# Patient Record
Sex: FEMALE | Race: WHITE | Employment: FULL TIME | URBAN - METROPOLITAN AREA
[De-identification: names, ages, dates, MRNs, and addresses within clinical notes are randomized per-mention and may not be internally consistent; named-entity substitution may affect disease eponyms.]

---

## 2017-02-20 RX ORDER — FENOFIBRIC ACID 135 MG/1
135 CAPSULE, DELAYED RELEASE ORAL DAILY
Qty: 30 CAPSULE | Refills: 4 | Status: SHIPPED | OUTPATIENT
Start: 2017-02-20 | End: 2017-08-04 | Stop reason: SDUPTHER

## 2017-07-28 RX ORDER — FENOFIBRIC ACID 135 MG/1
CAPSULE, DELAYED RELEASE ORAL
Qty: 30 CAPSULE | Refills: 4 | OUTPATIENT
Start: 2017-07-28

## 2017-08-04 ENCOUNTER — OFFICE VISIT (OUTPATIENT)
Dept: FAMILY MEDICINE CLINIC | Age: 57
End: 2017-08-04

## 2017-08-04 VITALS
SYSTOLIC BLOOD PRESSURE: 130 MMHG | HEART RATE: 86 BPM | DIASTOLIC BLOOD PRESSURE: 82 MMHG | BODY MASS INDEX: 28.35 KG/M2 | WEIGHT: 155 LBS | OXYGEN SATURATION: 98 %

## 2017-08-04 DIAGNOSIS — Z00.00 HEALTHCARE MAINTENANCE: Primary | ICD-10-CM

## 2017-08-04 DIAGNOSIS — L40.50 PSORIATIC ARTHRITIS (HCC): ICD-10-CM

## 2017-08-04 DIAGNOSIS — Z11.59 NEED FOR HEPATITIS C SCREENING TEST: ICD-10-CM

## 2017-08-04 DIAGNOSIS — E55.9 VITAMIN D DEFICIENCY: ICD-10-CM

## 2017-08-04 DIAGNOSIS — Z12.31 ENCOUNTER FOR SCREENING MAMMOGRAM FOR BREAST CANCER: ICD-10-CM

## 2017-08-04 DIAGNOSIS — Z11.4 SCREENING FOR HIV WITHOUT PRESENCE OF RISK FACTORS: ICD-10-CM

## 2017-08-04 DIAGNOSIS — Z12.11 SCREENING FOR COLON CANCER: ICD-10-CM

## 2017-08-04 DIAGNOSIS — E78.2 MIXED HYPERLIPIDEMIA: ICD-10-CM

## 2017-08-04 PROCEDURE — 99396 PREV VISIT EST AGE 40-64: CPT | Performed by: NURSE PRACTITIONER

## 2017-08-04 RX ORDER — FENOFIBRIC ACID 135 MG/1
135 CAPSULE, DELAYED RELEASE ORAL DAILY
Qty: 30 CAPSULE | Refills: 4 | Status: SHIPPED | OUTPATIENT
Start: 2017-08-04 | End: 2018-02-23 | Stop reason: SDUPTHER

## 2017-08-04 ASSESSMENT — ENCOUNTER SYMPTOMS
BACK PAIN: 0
SHORTNESS OF BREATH: 0
SINUS PRESSURE: 0
NAUSEA: 0
CHEST TIGHTNESS: 0
EYE PAIN: 0
BLOOD IN STOOL: 0
ABDOMINAL PAIN: 0
WHEEZING: 0
CONSTIPATION: 0
APNEA: 0
DIARRHEA: 0
EYE REDNESS: 0
COUGH: 0
VOMITING: 0
RHINORRHEA: 0
ABDOMINAL DISTENTION: 0

## 2017-10-16 ENCOUNTER — TELEPHONE (OUTPATIENT)
Dept: OTHER | Facility: CLINIC | Age: 57
End: 2017-10-16

## 2017-10-16 NOTE — TELEPHONE ENCOUNTER
Spoke with patient regarding status of FIT test.  Patient confirmed that test has not been completed yet. She forgot about the test.  Patient agreeed to complete test ASAP.

## 2017-11-13 ENCOUNTER — OFFICE VISIT (OUTPATIENT)
Dept: FAMILY MEDICINE CLINIC | Age: 57
End: 2017-11-13

## 2017-11-13 VITALS
HEART RATE: 86 BPM | OXYGEN SATURATION: 98 % | BODY MASS INDEX: 28.89 KG/M2 | DIASTOLIC BLOOD PRESSURE: 78 MMHG | WEIGHT: 157 LBS | HEIGHT: 62 IN | SYSTOLIC BLOOD PRESSURE: 150 MMHG

## 2017-11-13 DIAGNOSIS — E66.3 OVERWEIGHT: ICD-10-CM

## 2017-11-13 DIAGNOSIS — E55.9 VITAMIN D DEFICIENCY: ICD-10-CM

## 2017-11-13 DIAGNOSIS — R03.0 ELEVATED BLOOD PRESSURE READING: ICD-10-CM

## 2017-11-13 DIAGNOSIS — L40.50 PSORIATIC ARTHRITIS (HCC): ICD-10-CM

## 2017-11-13 DIAGNOSIS — E78.5 HYPERLIPIDEMIA, UNSPECIFIED HYPERLIPIDEMIA TYPE: Primary | ICD-10-CM

## 2017-11-13 DIAGNOSIS — Z00.00 HEALTHCARE MAINTENANCE: ICD-10-CM

## 2017-11-13 PROCEDURE — 99214 OFFICE O/P EST MOD 30 MIN: CPT | Performed by: FAMILY MEDICINE

## 2017-11-13 NOTE — PROGRESS NOTES
Clara Ledesma   YOB: 1960    Date of Visit:  11/13/2017    No Known Allergies  Outpatient Prescriptions Marked as Taking for the 11/13/17 encounter (Office Visit) with Lisa Wheatley MD   Medication Sig Dispense Refill    fenofibric acid (FIBRICOR) 135 MG CPDR capsule Take 1 capsule by mouth daily 30 capsule 4    atorvastatin (LIPITOR) 40 MG tablet Take 1 tablet by mouth daily 30 tablet 5    methotrexate (RHEUMATREX) 2.5 MG chemo tablet Take 8 tablets by mouth once a week      folic acid (FOLVITE) 1 MG tablet       Cholecalciferol (VITAMIN D3) 2000 UNITS CAPS Take 1 capsule by mouth daily      aspirin 81 MG EC tablet Take 81 mg by mouth daily. Vitals:    11/13/17 1440   BP: (!) 150/78   Site: Right Arm   Pulse: 86   SpO2: 98%   Weight: 157 lb (71.2 kg)   Height: 5' 2\" (1.575 m)     Body mass index is 28.72 kg/m². Wt Readings from Last 3 Encounters:   11/13/17 157 lb (71.2 kg)   08/04/17 155 lb (70.3 kg)   12/16/15 155 lb (70.3 kg)     BP Readings from Last 3 Encounters:   11/13/17 (!) 150/78   08/04/17 130/82   12/16/15 138/74        HPI    Patient presents for pap. Psoriatic arthritis: Sees Rheumatologist:  Dr. Leandra Alonzo. Stable on current. HLD:  Taking lipitor and fibricor. Vit D def:  Taking MV daily. Elevated BP: Per patient just came from work which was very busy. Walks regularly. Watches her diet. HM: has FIT cards. Has scheduled mammogram for 12/15. Can't recall when she had Td last.  Planning to do her blood work but starts working at [employer] so can't come fasting. Overweight:  Social History     Social History    Marital status:      Spouse name: N/A    Number of children: N/A    Years of education: N/A     Occupational History    Not on file.      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 Doesn't like to talk about it. Review of Systems  As documented in the HPI. No cp or nikita. Physical Exam  GEN: well developed and well nourished, no acute distress  CV: regular rate and rhythm, no murmurs rubs or gallops  Resp: normal effort, clear auscultation bilaterally  Breast: nl breast exam. No palpable lumps. No axillary LAD. Assessment/Plan     1. Hyperlipidemia, unspecified hyperlipidemia type  Stable. Continue current regimen. Recheck labs as previously ordered. 2. Healthcare maintenance  - PAP SMEAR  Reminded to do FIT cards, labs, mammogram.  She declined Tdap today. 3. Psoriatic arthritis (HCC)  Stable. Continue current regimen. 4. Vitamin D deficiency  Stable. Continue current regimen. 5. Elevated blood pressure reading  Per pt 2/2 stress. Will monitor. Patient to monitor at home and advised to call us if it remains elevated. 6. Overweight  Counseled on lifestyle modifications including diet and exercise. Discussed medications with patient, who voiced understanding of their use and indications. All questions answered.     Return in about 1 year (around 11/13/2018) for Physical.

## 2017-11-16 LAB
HPV COMMENT: NORMAL
HPV TYPE 16: NOT DETECTED
HPV TYPE 18: NOT DETECTED
HPVOH (OTHER TYPES): NOT DETECTED

## 2018-02-05 DIAGNOSIS — E78.2 MIXED HYPERLIPIDEMIA: ICD-10-CM

## 2018-02-06 RX ORDER — ATORVASTATIN CALCIUM 40 MG/1
40 TABLET, FILM COATED ORAL DAILY
Qty: 30 TABLET | Refills: 5 | Status: SHIPPED | OUTPATIENT
Start: 2018-02-06 | End: 2018-05-09 | Stop reason: SDUPTHER

## 2018-02-28 DIAGNOSIS — Z11.59 NEED FOR HEPATITIS C SCREENING TEST: ICD-10-CM

## 2018-02-28 DIAGNOSIS — Z00.00 HEALTHCARE MAINTENANCE: ICD-10-CM

## 2018-02-28 DIAGNOSIS — Z11.4 SCREENING FOR HIV WITHOUT PRESENCE OF RISK FACTORS: ICD-10-CM

## 2018-02-28 DIAGNOSIS — E78.2 MIXED HYPERLIPIDEMIA: ICD-10-CM

## 2018-02-28 DIAGNOSIS — E55.9 VITAMIN D DEFICIENCY: ICD-10-CM

## 2018-02-28 LAB
ANION GAP SERPL CALCULATED.3IONS-SCNC: 13 MMOL/L (ref 3–16)
BUN BLDV-MCNC: 19 MG/DL (ref 7–20)
CALCIUM SERPL-MCNC: 9.3 MG/DL (ref 8.3–10.6)
CHLORIDE BLD-SCNC: 104 MMOL/L (ref 99–110)
CHOLESTEROL, TOTAL: 234 MG/DL (ref 0–199)
CO2: 26 MMOL/L (ref 21–32)
CREAT SERPL-MCNC: 0.6 MG/DL (ref 0.6–1.1)
GFR AFRICAN AMERICAN: >60
GFR NON-AFRICAN AMERICAN: >60
GLUCOSE BLD-MCNC: 113 MG/DL (ref 70–99)
HDLC SERPL-MCNC: 43 MG/DL (ref 40–60)
HEPATITIS C ANTIBODY INTERPRETATION: NORMAL
LDL CHOLESTEROL CALCULATED: 132 MG/DL
POTASSIUM SERPL-SCNC: 4.6 MMOL/L (ref 3.5–5.1)
SODIUM BLD-SCNC: 143 MMOL/L (ref 136–145)
TRIGL SERPL-MCNC: 297 MG/DL (ref 0–150)
VLDLC SERPL CALC-MCNC: 59 MG/DL

## 2018-03-01 LAB
ESTIMATED AVERAGE GLUCOSE: 105.4 MG/DL
HBA1C MFR BLD: 5.3 %
HIV AG/AB: NORMAL
HIV ANTIGEN: NORMAL
HIV-1 ANTIBODY: NORMAL
HIV-2 AB: NORMAL
VITAMIN D 25-HYDROXY: 27.6 NG/ML

## 2018-03-20 DIAGNOSIS — E78.2 MIXED HYPERLIPIDEMIA: ICD-10-CM

## 2018-03-20 RX ORDER — FENOFIBRIC ACID 135 MG/1
135 CAPSULE, DELAYED RELEASE ORAL DAILY
Qty: 90 CAPSULE | Refills: 1 | Status: SHIPPED | OUTPATIENT
Start: 2018-03-20 | End: 2018-10-02 | Stop reason: SDUPTHER

## 2018-03-23 DIAGNOSIS — E78.2 MIXED HYPERLIPIDEMIA: ICD-10-CM

## 2018-03-23 RX ORDER — FENOFIBRIC ACID 135 MG/1
135 CAPSULE, DELAYED RELEASE ORAL DAILY
Qty: 30 CAPSULE | Refills: 0 | OUTPATIENT
Start: 2018-03-23

## 2018-03-30 DIAGNOSIS — E78.2 MIXED HYPERLIPIDEMIA: ICD-10-CM

## 2018-04-02 RX ORDER — FENOFIBRIC ACID 135 MG/1
135 CAPSULE, DELAYED RELEASE ORAL DAILY
Qty: 30 CAPSULE | Refills: 0 | OUTPATIENT
Start: 2018-04-02

## 2018-05-09 DIAGNOSIS — E78.2 MIXED HYPERLIPIDEMIA: ICD-10-CM

## 2018-05-10 RX ORDER — ATORVASTATIN CALCIUM 40 MG/1
40 TABLET, FILM COATED ORAL DAILY
Qty: 30 TABLET | Refills: 5 | Status: SHIPPED | OUTPATIENT
Start: 2018-05-10 | End: 2020-10-02 | Stop reason: SDUPTHER

## 2018-10-02 DIAGNOSIS — E78.2 MIXED HYPERLIPIDEMIA: ICD-10-CM

## 2018-10-02 RX ORDER — FENOFIBRIC ACID 135 MG/1
135 CAPSULE, DELAYED RELEASE ORAL DAILY
Qty: 90 CAPSULE | Refills: 1 | Status: SHIPPED | OUTPATIENT
Start: 2018-10-02 | End: 2019-11-15 | Stop reason: SDUPTHER

## 2019-01-25 DIAGNOSIS — E78.2 MIXED HYPERLIPIDEMIA: ICD-10-CM

## 2019-01-28 RX ORDER — ATORVASTATIN CALCIUM 40 MG/1
TABLET, FILM COATED ORAL
Qty: 30 TABLET | Refills: 0 | OUTPATIENT
Start: 2019-01-28

## 2019-04-12 DIAGNOSIS — E78.2 MIXED HYPERLIPIDEMIA: ICD-10-CM

## 2019-04-15 RX ORDER — FENOFIBRIC ACID 135 MG/1
135 CAPSULE, DELAYED RELEASE ORAL DAILY
Qty: 30 CAPSULE | Refills: 0 | OUTPATIENT
Start: 2019-04-15

## 2019-04-30 ENCOUNTER — HOSPITAL ENCOUNTER (OUTPATIENT)
Dept: MAMMOGRAPHY | Age: 59
Discharge: HOME | End: 2019-05-05
Payer: COMMERCIAL

## 2019-04-30 DIAGNOSIS — Z12.31 VISIT FOR SCREENING MAMMOGRAM: ICD-10-CM

## 2019-04-30 PROCEDURE — 77067 SCR MAMMO BI INCL CAD: CPT

## 2019-11-15 DIAGNOSIS — E78.2 MIXED HYPERLIPIDEMIA: ICD-10-CM

## 2019-11-18 RX ORDER — FENOFIBRIC ACID 135 MG/1
135 CAPSULE, DELAYED RELEASE ORAL DAILY
Qty: 90 CAPSULE | Refills: 1 | Status: SHIPPED | OUTPATIENT
Start: 2019-11-18

## 2019-11-29 DIAGNOSIS — E78.2 MIXED HYPERLIPIDEMIA: ICD-10-CM

## 2019-11-29 RX ORDER — ATORVASTATIN CALCIUM 40 MG/1
40 TABLET, FILM COATED ORAL DAILY
Qty: 90 TABLET | Refills: 1 | OUTPATIENT
Start: 2019-11-29

## 2020-09-10 ENCOUNTER — TELEPHONE (OUTPATIENT)
Dept: FAMILY MEDICINE CLINIC | Age: 60
End: 2020-09-10

## 2020-09-10 NOTE — TELEPHONE ENCOUNTER
----- Message from [employer] sent at 9/10/2020  4:25 PM EDT -----  Subject: Refill Request    QUESTIONS  Name of Medication? fenofibric acid (FIBRICOR) 135 MG CPDR capsule  Patient-reported dosage and instructions? unknown  How many days do you have left? 0  Preferred Pharmacy? [facility]  Pharmacy phone number (if available)? [phone]  Additional Information for Provider? out of medication//appointment   request sent via message  ---------------------------------------------------------------------------  --------------  [address]  What is the best way for the office to contact you? OK to leave message on   voicemail   OK to respond with secure message via [employer] portal (only for patients   who have registered [employer] account)  Preferred Call Back Phone Number?  [phone]

## 2020-09-10 NOTE — TELEPHONE ENCOUNTER
Medication: request denied, patient needs appointment. Last OV 11.13.2017 - [facility] for patient to call to schedule OV prior to refill. Requested Prescriptions      No prescriptions requested or ordered in this encounter        Last Filled:  11/18/2019 #90 w/ 1 refill     Patient Phone Number: [phone] (home) [phone] (work)    Last appt: 11.13.2017  Next appt: Visit date not found    Last OARRS: No flowsheet data found.

## 2020-09-11 ENCOUNTER — TELEPHONE (OUTPATIENT)
Dept: FAMILY MEDICINE CLINIC | Age: 60
End: 2020-09-11

## 2020-09-11 NOTE — TELEPHONE ENCOUNTER
----- Message from Adan Olivarez sent at 9/11/2020  8:40 AM EDT -----  Subject: Message to Provider    QUESTIONS  Information for Provider? Pt needs cholesterol check done before being   seen and before she can get medication refill. Please place order for   bloodwork and let her know where to go.   ---------------------------------------------------------------------------  --------------  CALL BACK INFO  What is the best way for the office to contact you? OK to leave message on   voicemail  Preferred Call Back Phone Number? [phone]  ---------------------------------------------------------------------------  --------------  SCRIPT ANSWERS  Relationship to Patient?  Self
Patient requesting lab orders. - pended  Also needs to schedule appointment.    Last OV 11.13.2017
Preferred Call Back Phone Number?  [phone]  [facility] for patient to call back to schedule physical. Labs will be ordered at the time of her visit per Dr. Davin Cavazos
Scheduled 10.01.2020
Since she has not been seen in 3 years she will need to do her appointment prior to doing lab orders. She can plan to fast and do the labs at her visit if she would like after her visit though.
5

## 2020-09-11 NOTE — TELEPHONE ENCOUNTER
----- Message from Weisami Galindo sent at 9/11/2020 11:02 AM EDT -----  Subject: Appointment Request    Reason for Call: Routine Physical Exam with PAP    QUESTIONS  Type of Appointment? Established Patient  Reason for appointment request? Available appointments did not meet   patient need  Additional Information for Provider? Pt is out of meds and said she needs   to be seen before she can get a refills on atorvastin and fenofibric No   appts available before 10/1. Please advise pt if she can be seen before   10/1.   ---------------------------------------------------------------------------  --------------  CALL BACK INFO  What is the best way for the office to contact you? OK to leave message on   voicemail  Preferred Call Back Phone Number? [phone]  ---------------------------------------------------------------------------  --------------  SCRIPT ANSWERS  Relationship to Patient? Self  Appointment reason? Well Care/Follow Ups  Select a Well Care/Follow Ups appointment reason? Adult Physical Exam   [Medicare Annual Wellness   AWV   PAP   Pelvic]  (Is the patient requesting to be seen urgently for their symptoms?)? No  (If the patient is female   ask this question) Are you requesting a pap smear with your physical   exam? Yes  (Patient is Medicare and requesting Annual Wellness Visit)? No  Have you been diagnosed with   tested for   or told that you are suspected of having COVID-19 (Coronavirus)? No  Have you had a fever or taken medication to treat a fever within the past   3 days? No  Have you had a cough   shortness of breath or flu-like symptoms within the past 3 days? No  Do you currently have flu-like symptoms including fever or chills   cough   shortness of breath   or difficulty breathing   or new loss of taste or smell? No  (Service Expert  click yes below to proceed with "[employer] As Usual   Scheduling)?  Yes

## 2020-10-01 ENCOUNTER — OFFICE VISIT (OUTPATIENT)
Dept: FAMILY MEDICINE CLINIC | Age: 60
End: 2020-10-01
Payer: COMMERCIAL

## 2020-10-01 VITALS
BODY MASS INDEX: 28.89 KG/M2 | HEART RATE: 84 BPM | OXYGEN SATURATION: 98 % | TEMPERATURE: 98.1 F | SYSTOLIC BLOOD PRESSURE: 138 MMHG | DIASTOLIC BLOOD PRESSURE: 70 MMHG | WEIGHT: 157 LBS | HEIGHT: 62 IN

## 2020-10-01 DIAGNOSIS — Z00.00 HEALTHCARE MAINTENANCE: ICD-10-CM

## 2020-10-01 DIAGNOSIS — E55.9 VITAMIN D DEFICIENCY: ICD-10-CM

## 2020-10-01 LAB
ANION GAP SERPL CALCULATED.3IONS-SCNC: 13 MMOL/L (ref 3–16)
BASOPHILS ABSOLUTE: 0.1 K/UL (ref 0–0.2)
BASOPHILS RELATIVE PERCENT: 0.9 %
BUN BLDV-MCNC: 10 MG/DL (ref 7–20)
CALCIUM SERPL-MCNC: 9.9 MG/DL (ref 8.3–10.6)
CHLORIDE BLD-SCNC: 105 MMOL/L (ref 99–110)
CHOLESTEROL, TOTAL: 338 MG/DL (ref 0–199)
CO2: 25 MMOL/L (ref 21–32)
CREAT SERPL-MCNC: 0.5 MG/DL (ref 0.6–1.2)
EOSINOPHILS ABSOLUTE: 0.1 K/UL (ref 0–0.6)
EOSINOPHILS RELATIVE PERCENT: 2.3 %
GFR AFRICAN AMERICAN: >60
GFR NON-AFRICAN AMERICAN: >60
GLUCOSE BLD-MCNC: 100 MG/DL (ref 70–99)
HCT VFR BLD CALC: 42.7 % (ref 36–48)
HDLC SERPL-MCNC: 36 MG/DL (ref 40–60)
HEMOGLOBIN: 14.5 G/DL (ref 12–16)
LDL CHOLESTEROL CALCULATED: 246 MG/DL
LYMPHOCYTES ABSOLUTE: 1.2 K/UL (ref 1–5.1)
LYMPHOCYTES RELATIVE PERCENT: 19 %
MCH RBC QN AUTO: 32.5 PG (ref 26–34)
MCHC RBC AUTO-ENTMCNC: 33.9 G/DL (ref 31–36)
MCV RBC AUTO: 96 FL (ref 80–100)
MONOCYTES ABSOLUTE: 0.5 K/UL (ref 0–1.3)
MONOCYTES RELATIVE PERCENT: 7.4 %
NEUTROPHILS ABSOLUTE: 4.3 K/UL (ref 1.7–7.7)
NEUTROPHILS RELATIVE PERCENT: 70.4 %
PDW BLD-RTO: 13.1 % (ref 12.4–15.4)
PLATELET # BLD: 224 K/UL (ref 135–450)
PMV BLD AUTO: 8.8 FL (ref 5–10.5)
POTASSIUM SERPL-SCNC: 4.7 MMOL/L (ref 3.5–5.1)
RBC # BLD: 4.44 M/UL (ref 4–5.2)
SODIUM BLD-SCNC: 143 MMOL/L (ref 136–145)
TRIGL SERPL-MCNC: 282 MG/DL (ref 0–150)
VITAMIN D 25-HYDROXY: 52.8 NG/ML
VLDLC SERPL CALC-MCNC: 56 MG/DL
WBC # BLD: 6.2 K/UL (ref 4–11)

## 2020-10-01 PROCEDURE — 99396 PREV VISIT EST AGE 40-64: CPT | Performed by: FAMILY MEDICINE

## 2020-10-01 ASSESSMENT — PATIENT HEALTH QUESTIONNAIRE - PHQ9
1. LITTLE INTEREST OR PLEASURE IN DOING THINGS: 0
SUM OF ALL RESPONSES TO PHQ QUESTIONS 1-9: 0
SUM OF ALL RESPONSES TO PHQ9 QUESTIONS 1 & 2: 0
2. FEELING DOWN, DEPRESSED OR HOPELESS: 0
SUM OF ALL RESPONSES TO PHQ QUESTIONS 1-9: 0

## 2020-10-01 NOTE — PROGRESS NOTES
History and Physical      Chief Complaint:   Lindsey Le is a 60 y.o. female who presents for complete physical examination. Chief Complaint   Patient presents with    Annual Exam     fasting today    Gynecologic Exam       HPI: Psoriatic arthritis: Sees Rheumatologist:  Dr. Ani Cuellar. Has not been in awhile as not sure about the medication. Using CBD oil and arthritis cream and it is really good. Stable on current.     HLD: Off the lipitor and just finished the fenofibrate - 1 week ago. She has changed her diet and trying to make good choices.      Vit D def:  Taking MV daily and vit D.      LMP about age 49.      SH: has 2 grand babies. Lives with son, his girl friend and their two kids and her daughter. Working.        Vitals:    10/01/20 1128   BP: 138/70   Site: Left Upper Arm   Position: Sitting   Cuff Size: Medium Adult   Pulse: 84   Temp: 98.1 °F (36.7 °C)   TempSrc: Temporal   SpO2: 98%   Weight: 157 lb (71.2 kg)   Height: 5' 2\" (1.575 m)       Wt Readings from Last 3 Encounters:   10/01/20 157 lb (71.2 kg)   11/13/17 157 lb (71.2 kg)   08/04/17 155 lb (70.3 kg)     BP Readings from Last 3 Encounters:   10/01/20 138/70   11/13/17 (!) 150/78   08/04/17 130/82       Patient Active Problem List   Diagnosis    Hyperlipidemia    Hypertriglyceridemia    Dermatitis    Neck stiffness    Toe pain    Eczema    Neck pain    Muscle spasm    Knee swelling    Psoriatic arthritis (HCC)    Vitamin D deficiency       Preventive Care:  Health Maintenance   Topic Date Due    DTaP/Tdap/Td vaccine (1 - Tdap) 10/11/1978    Colon cancer screen colonoscopy  10/11/2009    Lipid screen  02/28/2019    Flu vaccine (1) 10/01/2021 (Originally 9/1/2020)    Shingles Vaccine (1 of 2) 10/01/2021 (Originally 10/11/2009)    Diabetes screen  02/28/2021    Breast cancer screen  04/30/2021    Cervical cancer screen  11/13/2022    Hepatitis C screen  Completed    HIV screen  Completed    Hepatitis A vaccine  Aged Out    Hepatitis B vaccine  Aged Out    Hib vaccine  Aged Out    Meningococcal (ACWY) vaccine  Aged Out    Pneumococcal 0-64 years Vaccine  Aged Out          There is no immunization history on file for this patient. No Known Allergies  Outpatient Medications Marked as Taking for the 10/1/20 encounter (Office Visit) with Izzy Ferris MD   Medication Sig Dispense Refill    Cholecalciferol (VITAMIN D3) 2000 UNITS CAPS Take 1 capsule by mouth daily      aspirin 81 MG EC tablet Take 81 mg by mouth daily. Past Medical History:   Diagnosis Date    Hyperlipidemia     Psoriatic arthritis ([address]      Past Surgical History:   Procedure Laterality Date    WISDOM TOOTH EXTRACTION       Family History   Problem Relation Age of Onset    Mult Sclerosis Mother     Heart Attack Father 61     Social History     Socioeconomic History    Marital status:      Spouse name: Not on file    Number of children: Not on file    Years of education: Not on file    Highest education level: Not on file   Occupational History    Not on file   Social Needs    Financial resource strain: Not on file    Food insecurity     Worry: Not on file     Inability: Not on file    Transportation needs     Medical: Not on file     Non-medical: Not on file   Tobacco Use    Smoking status: Former Smoker     Packs/day: 0.25     Years: 17.00     Pack years: 4.25     Types: Cigarettes     Start date: 1980     Last attempt to quit: 1997     Years since quittin.7    Smokeless tobacco: Never Used   Substance and Sexual Activity    Alcohol use:  Yes     Alcohol/week: 7.0 standard drinks     Types: 7 Glasses of wine per week     Comment: 1 glass of wine a night    Drug use: No    Sexual activity: Yes     Partners: Male     Comment: ; 2 children   Lifestyle    Physical activity     Days per week: Not on file     Minutes per session: Not on file    Stress: Not on file   Relationships    Social connections Talks on phone: Not on file     Gets together: Not on file     Attends [employer] service: Not on file     Active member of club or organization: Not on file     Attends meetings of clubs or organizations: Not on file     Relationship status: Not on file    Intimate partner violence     Fear of current or ex partner: Not on file     Emotionally abused: Not on file     Physically abused: Not on file     Forced sexual activity: Not on file   Other Topics Concern    Not on file   Social History Narrative    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 Doesn't like to talk about it. Review of Systems:  A comprehensive review of systems was negative except for what was noted in the HPI. Physical Exam:   Vitals:    10/01/20 1128   BP: 138/70   Site: Left Upper Arm   Position: Sitting   Cuff Size: Medium Adult   Pulse: 84   Temp: 98.1 °F (36.7 °C)   TempSrc: Temporal   SpO2: 98%   Weight: 157 lb (71.2 kg)   Height: 5' 2\" (1.575 m)     Body mass index is 28.72 kg/m². Constitutional: She is oriented to person, place, and time. She appears well-developed and well-nourished. No distress. HEENT:   Head: Normocephalic and atraumatic. Right Ear: Tympanic membrane, external ear and ear canal normal.   Left Ear: Tympanic membrane, external ear and ear canal normal.   Nose: Nose normal.   Mouth/Throat: Oropharynx is clear and moist, and mucous membranes are normal.  There is no cervical adenopathy. Eyes: Conjunctivae and extraocular motions are normal. Pupils are equal, round, and reactive to light. Neck: Neck supple. No mass and no thyromegaly present. Cardiovascular: Normal rate, regular rhythm, normal heart sounds. Exam reveals no gallop and no friction rub. No murmur heard. Pulmonary/Chest: Effort normal and breath sounds normal. No respiratory distress. She has no wheezes, rhonchi or rales. Abdominal: Soft, non-tender.  Bowel sounds are normal. She exhibits no palpable organomegaly or mass. Genitourinary: normal external genitalia, vulva, vagina, cervix, uterus and adnexa. Breast exam:  breasts appear normal, no suspicious masses, no skin or nipple changes or axillary nodes. Musculoskeletal: Normal range of motion. She exhibits no edema. Neurological: She is alert and oriented. No cranial nerve deficit. Coordination normal.   Skin: Skin is warm and dry. There is no rash or erythema. Psychiatric: She has a normal mood and affect. Her speech is normal and behavior is normal. Judgment, cognition and memory are normal.       Assessment/Plan     1. Healthcare maintenance  Annual physical exam done today. Counseled on preventative care and a healthy lifestlye. - Kerrie Watt MD, Gastroenterology, [address] Metabolic Panel; Future  - CBC Auto Differential; Future  - Lipid Panel; Future  - Hemoglobin A1C; Future  - Vitamin D 25 Hydroxy; Future  - PAP SMEAR  - DEB DIGITAL SCREEN W OR WO CAD BILATERAL; Future    2. Hyperlipidemia, unspecified hyperlipidemia type  Stable. Continue current regimen. Recheck. Patient has been off the lipitor a long time- will see how it is and decide if needs to go back on meds. 3. Hypertriglyceridemia  Stable. Recheck and will decide if needs to continue fenofibrate. Work on diet and exercise. 4. Psoriatic arthritis (HCC)  Stable. Continue current regimen. Has a rheumatologist.     5. Vitamin D deficiency  Stable. Continue current regimen. - Vitamin D 25 Hydroxy; Future    6. Post-menopausal  - DEXA BONE DENSITY 2 SITES; Future      Discussed medications with patient, who voiced understanding of their use and indications. All questions answered.     Return in about 1 year (around 10/1/2021) for Physical.

## 2020-10-02 ENCOUNTER — TELEPHONE (OUTPATIENT)
Dept: FAMILY MEDICINE CLINIC | Age: 60
End: 2020-10-02

## 2020-10-02 LAB
ESTIMATED AVERAGE GLUCOSE: 114 MG/DL
HBA1C MFR BLD: 5.6 %

## 2020-10-02 RX ORDER — ATORVASTATIN CALCIUM 40 MG/1
40 TABLET, FILM COATED ORAL DAILY
Qty: 90 TABLET | Refills: 0 | Status: SHIPPED | OUTPATIENT
Start: 2020-10-02 | End: 2020-12-28

## 2020-10-02 NOTE — TELEPHONE ENCOUNTER
ECC received a call from: Kimberlee from [address] [place] [address] Pt received a referral for \"healthcare maintenance\" without specifics and she (both kimberlee and the pt Emily Ortiz) was unsure what the order was for. Name of Caller: Kimberlee from [facility]    Relationship to patient:GI Rep    Organization name:  [facility]    Best contact number: [phone]    Reason for call: Needs specifics of order

## 2020-12-28 RX ORDER — ATORVASTATIN CALCIUM 40 MG/1
TABLET, FILM COATED ORAL
Qty: 90 TABLET | Refills: 2 | Status: SHIPPED | OUTPATIENT
Start: 2020-12-28 | End: 2021-11-15

## 2020-12-28 NOTE — TELEPHONE ENCOUNTER
Medication:   Requested Prescriptions     Pending Prescriptions Disp Refills    atorvastatin (LIPITOR) 40 MG tablet [Pharmacy Med Name: ATORVASTATIN 40 MG TABLET] 90 tablet 0     Sig: TAKE 1 TABLET BY MOUTH EVERY DAY        Last Filled:  10/2/2020 90 tabs 0 refills     Patient Phone Number: [phone] (home) [phone] (work)    Last appt: 10/1/2020 Return in about 1 year (around 10/1/2021) for Physical.      Next appt: Visit date not found    Last OARRS: No flowsheet data found.

## 2021-07-07 ENCOUNTER — HOSPITAL ENCOUNTER (OUTPATIENT)
Dept: MAMMOGRAPHY | Age: 61
Discharge: HOME | End: 2021-07-12
Payer: COMMERCIAL

## 2021-07-07 VITALS — WEIGHT: 140 LBS | BODY MASS INDEX: 25.76 KG/M2 | HEIGHT: 62 IN

## 2021-07-07 DIAGNOSIS — Z12.31 VISIT FOR SCREENING MAMMOGRAM: ICD-10-CM

## 2021-07-07 PROCEDURE — 77063 BREAST TOMOSYNTHESIS BI: CPT

## 2021-11-13 DIAGNOSIS — E78.2 MIXED HYPERLIPIDEMIA: ICD-10-CM

## 2021-11-15 RX ORDER — ATORVASTATIN CALCIUM 40 MG/1
TABLET, FILM COATED ORAL
Qty: 90 TABLET | Refills: 2 | Status: SHIPPED | OUTPATIENT
Start: 2021-11-15 | End: 2022-10-24

## 2021-11-15 NOTE — TELEPHONE ENCOUNTER
Medication:   Requested Prescriptions     Pending Prescriptions Disp Refills    atorvastatin (LIPITOR) 40 MG tablet [Pharmacy Med Name: ATORVASTATIN 40 MG TABLET] 90 tablet 2     Sig: TAKE 1 TABLET BY MOUTH EVERY DAY       Last Filled:  12/28/2020 #90 Refills 2    Patient Phone Number: [phone] (home) [phone] (work)    Last appt: 10/1/2020   Return in about 1 year (around 10/1/2021) for Physical.      Next appt: Visit date not found    Last Labs DM:   Lab Results   Component Value Date    LABA1C 5.6 10/01/2020     Last Lipid:   Lab Results   Component Value Date    CHOL 338 10/01/2020    TRIG 282 10/01/2020    HDL 36 10/01/2020    HDL 51 04/09/2012    [address] 246 10/01/2020     Last PSA: No results found for: PSA  Last Thyroid:   Lab Results   Component Value Date    TSH 4.39 09/30/2011

## 2022-07-08 ENCOUNTER — HOSPITAL ENCOUNTER (OUTPATIENT)
Dept: MAMMOGRAPHY | Age: 62
Discharge: HOME | End: 2022-07-08
Payer: COMMERCIAL

## 2022-07-08 VITALS — BODY MASS INDEX: 25.76 KG/M2 | WEIGHT: 140 LBS | HEIGHT: 62 IN

## 2022-07-08 DIAGNOSIS — Z12.39 SCREENING BREAST EXAMINATION: ICD-10-CM

## 2022-07-08 PROCEDURE — 77067 SCR MAMMO BI INCL CAD: CPT

## 2022-10-23 DIAGNOSIS — E78.2 MIXED HYPERLIPIDEMIA: ICD-10-CM

## 2022-10-24 RX ORDER — ATORVASTATIN CALCIUM 40 MG/1
TABLET, FILM COATED ORAL
Qty: 30 TABLET | Refills: 0 | Status: SHIPPED | OUTPATIENT
Start: 2022-10-24

## 2022-10-24 RX ORDER — FOLIC ACID 1 MG/1
TABLET ORAL
COMMUNITY
Start: 2022-09-30

## 2022-10-24 NOTE — TELEPHONE ENCOUNTER
I spoke with patient & she said she will call tomorrow to schedule an appointment     Medication:   Requested Prescriptions     Pending Prescriptions Disp Refills    atorvastatin (LIPITOR) 40 MG tablet [Pharmacy Med Name: ATORVASTATIN 40 MG TABLET] 90 tablet 2     Sig: TAKE 1 TABLET BY MOUTH EVERY DAY       Last Filled:  11/15/2021 #90 w/1 RF     Patient Phone Number: [phone] (home) [phone] (work)    Last appt: 10/1/2020   Next appt: Visit date not found    Last Lipid:   Lab Results   Component Value Date/Time    CHOL 338 10/01/2020 12:33 PM    TRIG 282 10/01/2020 12:33 PM    HDL 36 10/01/2020 12:33 PM    HDL 51 04/09/2012 09:26 AM    LDLCALC 246 10/01/2020 12:33 PM

## 2023-02-07 DIAGNOSIS — E78.2 MIXED HYPERLIPIDEMIA: ICD-10-CM

## 2023-02-07 NOTE — TELEPHONE ENCOUNTER
----- Message from Rosa Maria Ma sent at 2/7/2023 11:05 AM EST -----  Subject: Refill Request    QUESTIONS  Name of Medication? atorvastatin (LIPITOR) 40 MG tablet  Patient-reported dosage and instructions? 40 MG Tablets 1 Tablet per day  How many days do you have left? 2  Preferred Pharmacy? [facility] phone number (if available)? [phone]  ---------------------------------------------------------------------------  --------------  Gama Hirsch INFO  What is the best way for the office to contact you? OK to leave message on   voicemail  Preferred Call Back Phone Number? [phone]  ---------------------------------------------------------------------------  --------------  SCRIPT ANSWERS  Relationship to Patient?  Self

## 2023-02-07 NOTE — TELEPHONE ENCOUNTER
Medication:   Requested Prescriptions     Pending Prescriptions Disp Refills    atorvastatin (LIPITOR) 40 MG tablet 30 tablet 0     Sig: TAKE 1 TABLET BY MOUTH EVERY DAY       Last Filled:  10/24/2022 #30 w/o RF     Patient Phone Number: [phone] (home)     Last appt: 10/1/2020   Next appt: 2/9/2023    Last Lipid:   Lab Results   Component Value Date/Time    CHOL 338 10/01/2020 12:33 PM    TRIG 282 10/01/2020 12:33 PM    HDL 36 10/01/2020 12:33 PM    HDL 51 04/09/2012 09:26 AM    LDLCALC 246 10/01/2020 12:33 PM

## 2023-02-08 RX ORDER — ATORVASTATIN CALCIUM 40 MG/1
TABLET, FILM COATED ORAL
Qty: 30 TABLET | Refills: 0 | Status: SHIPPED | OUTPATIENT
Start: 2023-02-08 | End: 2023-02-08

## 2023-02-13 SDOH — ECONOMIC STABILITY: INCOME INSECURITY: HOW HARD IS IT FOR YOU TO PAY FOR THE VERY BASICS LIKE FOOD, HOUSING, MEDICAL CARE, AND HEATING?: PATIENT DECLINED

## 2023-02-13 SDOH — ECONOMIC STABILITY: FOOD INSECURITY: WITHIN THE PAST 12 MONTHS, YOU WORRIED THAT YOUR FOOD WOULD RUN OUT BEFORE YOU GOT MONEY TO BUY MORE.: PATIENT DECLINED

## 2023-02-13 SDOH — ECONOMIC STABILITY: HOUSING INSECURITY
IN THE LAST 12 MONTHS, WAS THERE A TIME WHEN YOU DID NOT HAVE A STEADY PLACE TO SLEEP OR SLEPT IN A SHELTER (INCLUDING NOW)?: NO

## 2023-02-13 SDOH — ECONOMIC STABILITY: FOOD INSECURITY: WITHIN THE PAST 12 MONTHS, THE FOOD YOU BOUGHT JUST DIDN'T LAST AND YOU DIDN'T HAVE MONEY TO GET MORE.: PATIENT DECLINED

## 2023-02-13 SDOH — ECONOMIC STABILITY: TRANSPORTATION INSECURITY
IN THE PAST 12 MONTHS, HAS LACK OF TRANSPORTATION KEPT YOU FROM MEETINGS, WORK, OR FROM GETTING THINGS NEEDED FOR DAILY LIVING?: NO

## 2023-02-16 ENCOUNTER — OFFICE VISIT (OUTPATIENT)
Dept: FAMILY MEDICINE CLINIC | Age: 63
End: 2023-02-16
Payer: COMMERCIAL

## 2023-02-16 VITALS
TEMPERATURE: 97.8 F | OXYGEN SATURATION: 98 % | DIASTOLIC BLOOD PRESSURE: 82 MMHG | HEART RATE: 90 BPM | HEIGHT: 62 IN | SYSTOLIC BLOOD PRESSURE: 134 MMHG | BODY MASS INDEX: 28.16 KG/M2 | WEIGHT: 153 LBS

## 2023-02-16 DIAGNOSIS — L40.50 PSORIATIC ARTHRITIS (HCC): ICD-10-CM

## 2023-02-16 DIAGNOSIS — Z00.00 HEALTHCARE MAINTENANCE: Primary | ICD-10-CM

## 2023-02-16 DIAGNOSIS — R73.9 ELEVATED BLOOD SUGAR: ICD-10-CM

## 2023-02-16 DIAGNOSIS — E78.5 HYPERLIPIDEMIA, UNSPECIFIED HYPERLIPIDEMIA TYPE: ICD-10-CM

## 2023-02-16 DIAGNOSIS — E55.9 VITAMIN D DEFICIENCY: ICD-10-CM

## 2023-02-16 PROCEDURE — 99396 PREV VISIT EST AGE 40-64: CPT | Performed by: FAMILY MEDICINE

## 2023-02-16 RX ORDER — ATORVASTATIN CALCIUM 40 MG/1
TABLET, FILM COATED ORAL
Qty: 90 TABLET | Refills: 3 | Status: SHIPPED | OUTPATIENT
Start: 2023-02-16

## 2023-02-16 ASSESSMENT — PATIENT HEALTH QUESTIONNAIRE - PHQ9
SUM OF ALL RESPONSES TO PHQ QUESTIONS 1-9: 0
2. FEELING DOWN, DEPRESSED OR HOPELESS: 0
SUM OF ALL RESPONSES TO PHQ QUESTIONS 1-9: 0
1. LITTLE INTEREST OR PLEASURE IN DOING THINGS: 0
SUM OF ALL RESPONSES TO PHQ9 QUESTIONS 1 & 2: 0
SUM OF ALL RESPONSES TO PHQ QUESTIONS 1-9: 0
SUM OF ALL RESPONSES TO PHQ QUESTIONS 1-9: 0

## 2023-02-16 NOTE — PROGRESS NOTES
History and Physical      Chief Complaint:   Aamir Rizo is a 60 y.o. female who presents for complete physical examination. Chief Complaint   Patient presents with    Follow-up     Had strep last week- on antibiotic- feels better    Cholesterol Problem     Wants to get lipids checked       HPI:     Strep: got amoxicillin from [facility] clinic and symptoms now resolved. Psoriatic arthritis: Sees Rheumatologist:  Dr. Akbar Nava. On MTX. HLD: Taking lipitor and off fenofibrate. She has changed her diet and trying to make good choices. Vit D def:  Taking MV daily and vit D. LMP about age 49. SH: 2023:  Retired in 2023. Has 2 grand babies - 2 boys. Lives with son, his girl friend and their two kids and her daughter. New baby is coming next month - girl. Has 2 kids. Daughter lives in [place].   of prostate cancer 7 years ago. From [facility] originally - moved here for her husbands job 23 years ago.        Vitals:    23 0957   BP: 134/82   Site: Right Upper Arm   Position: Sitting   Cuff Size: Small Adult   Pulse: 90   Temp: 97.8 °F (36.6 °C)   TempSrc: Temporal   SpO2: 98%   Weight: 153 lb (69.4 kg)   Height: 5' 2\" (1.575 m)       Wt Readings from Last 3 Encounters:   23 153 lb (69.4 kg)   22 140 lb (63.5 kg)   21 140 lb (63.5 kg)     BP Readings from Last 3 Encounters:   23 134/82   10/01/20 138/70   17 (!) 150/78       Patient Active Problem List   Diagnosis    Hyperlipidemia    Hypertriglyceridemia    Dermatitis    Neck stiffness    Toe pain    Eczema    Neck pain    Muscle spasm    Knee swelling    Psoriatic arthritis (HCC)    Vitamin D deficiency       Preventive Care:  Health Maintenance   Topic Date Due    COVID-19 Vaccine (1) Never done    Depression Screen  Never done    DTaP/Tdap/Td vaccine (1 - Tdap) Never done    Colorectal Cancer Screen  Never done    Shingles vaccine (1 of 2) Never done    Lipids  10/01/2021    Flu vaccine (1) Never done    Breast cancer screen  2023    Cervical cancer screen  10/01/2025    Hepatitis C screen  Completed    HIV screen  Completed    Hepatitis A vaccine  Aged Out    Hib vaccine  Aged Out    Meningococcal (ACWY) vaccine  Aged Out    Pneumococcal 0-64 years Vaccine  Aged Out          There is no immunization history on file for this patient. No Known Allergies  Outpatient Medications Marked as Taking for the 23 encounter (Office Visit) with Tao Smyth MD   Medication Sig Dispense Refill    atorvastatin (LIPITOR) 40 MG tablet TAKE 1 TABLET BY MOUTH EVERY DAY 90 tablet 3    folic acid (FOLVITE) 1 MG tablet       methotrexate (RHEUMATREX) 2.5 MG chemo tablet       Cholecalciferol (VITAMIN D3) 2000 UNITS CAPS Take 1 capsule by mouth daily      aspirin 81 MG EC tablet Take 81 mg by mouth daily. Past Medical History:   Diagnosis Date    Hyperlipidemia     Psoriatic arthritis ([address]      Past Surgical History:   Procedure Laterality Date    WISDOM TOOTH EXTRACTION       Family History   Problem Relation Age of Onset    Mult Sclerosis Mother     Heart Attack Father 61    Ovarian Cancer Sister 24    Breast Cancer Sister      Social History     Socioeconomic History    Marital status:      Spouse name: Not on file    Number of children: Not on file    Years of education: Not on file    Highest education level: Not on file   Occupational History    Not on file   Tobacco Use    Smoking status: Former     Packs/day: 0.25     Years: 17.00     Pack years: 4.25     Types: Cigarettes     Start date: 1980     Quit date: 1997     Years since quittin.1    Smokeless tobacco: Never   Vaping Use    Vaping Use: Never used   Substance and Sexual Activity    Alcohol use:  Yes     Alcohol/week: 7.0 standard drinks     Types: 7 Glasses of wine per week     Comment: 1 glass of wine a night    Drug use: No    Sexual activity: Yes     Partners: Male     Comment: ; 2 children   Other Topics Concern    Not on file   Social History Narrative    [place] [employer] [place]        12/11/2013     has prostate cancer s/p surgery  - given 3 to 5 years. Doesn't like to talk about it. Social Determinants of Health     Financial Resource Strain: Not on file   Food Insecurity: Not on file   Transportation Needs: Not on file   Physical Activity: Not on file   Stress: Not on file   Social Connections: Not on file   Intimate Partner Violence: Not on file   Housing Stability: Not on file       Review of Systems:  A comprehensive review of systems was negative except for what was noted in the HPI. Physical Exam:   Vitals:    02/16/23 0957   BP: 134/82   Site: Right Upper Arm   Position: Sitting   Cuff Size: Small Adult   Pulse: 90   Temp: 97.8 °F (36.6 °C)   TempSrc: Temporal   SpO2: 98%   Weight: 153 lb (69.4 kg)   Height: 5' 2\" (1.575 m)     Body mass index is 27.98 kg/m². Constitutional: She is oriented to person, place, and time. She appears well-developed and well-nourished. No distress. HEENT:   Head: Normocephalic and atraumatic. Right Ear: Tympanic membrane, external ear and ear canal normal.   Left Ear: Tympanic membrane, external ear and ear canal normal.   Nose: Nose normal.   Mouth/Throat: Oropharynx is clear and moist, and mucous membranes are normal.  There is no cervical adenopathy. Eyes: Conjunctivae and extraocular motions are normal. Pupils are equal, round, and reactive to light. Neck: Neck supple. No mass and no thyromegaly present. Cardiovascular: Normal rate, regular rhythm, normal heart sounds. Exam reveals no gallop and no friction rub. No murmur heard. Pulmonary/Chest: Effort normal and breath sounds normal. No respiratory distress. She has no wheezes, rhonchi or rales. Abdominal: Soft, non-tender. Bowel sounds are normal. She exhibits no palpable organomegaly or mass. Genitourinary:  Musculoskeletal: Normal range of motion. She exhibits no edema. Neurological: She is alert and oriented. No cranial nerve deficit. Coordination normal.   Skin: Skin is warm and dry. There is no rash or erythema. Psychiatric: She has a normal mood and affect. Her speech is normal and behavior is normal. Judgment, cognition and memory are normal.       Assessment/Plan     1. Healthcare maintenance  Annual physical exam done today. Counseled on preventative care and a healthy lifestlye. She would like to come back for Tdap and flu shots. Can also consider shingrix. - Lipid Panel; Future  - Basic Metabolic Panel; Future  - Fecal DNA Colorectal cancer screening (Cologuard)    2. Hyperlipidemia, unspecified hyperlipidemia type  Stable. Continue current regimen. Recheck. 3. Vitamin D deficiency  - Vitamin D 25 Hydroxy; Future    4. Psoriatic arthritis ([facility] [address]  Stable. Continue current regimen. Seeing rheum. 5. Elevated blood sugar  - Hemoglobin A1C; Future      Discussed medications with patient, who voiced understanding of their use and indications. All questions answered. Return in about 1 year (around 2/16/2024) for Physical.         Documentation was done using voice recognition dragon software. Efforts were made to ensure accuracy; however, inadvertent, unintentional computerized transcription errors may be present. --Tao Smyth MD on 2/16/2023    An electronic signature was used to authenticate this note.

## 2024-03-12 RX ORDER — ATORVASTATIN CALCIUM 40 MG/1
TABLET, FILM COATED ORAL
Qty: 30 TABLET | Refills: 0 | Status: SHIPPED | OUTPATIENT
Start: 2024-03-12

## 2024-03-12 NOTE — TELEPHONE ENCOUNTER
Medication:   Requested Prescriptions     Pending Prescriptions Disp Refills    atorvastatin (LIPITOR) 40 MG tablet [Pharmacy Med Name: Atorvastatin Calcium 40 MG Oral Tablet] 30 tablet 0     Sig: Take 1 tablet by mouth once daily       Last Filled:  02/16/2023 #90 3rf     Patient Phone Number: [phone] (home)     Last appt: 2/16/2023   Next appt: Visit date not found    Last Lipid:   Lab Results   Component Value Date/Time    CHOL 338 10/01/2020 12:33 PM    TRIG 282 10/01/2020 12:33 PM    HDL 36 10/01/2020 12:33 PM    HDL 51 04/09/2012 09:26 AM    LDLCALC 246 10/01/2020 12:33 PM

## 2024-03-12 NOTE — TELEPHONE ENCOUNTER
Patient is due for an appointment.  Please have them do a visit prior to refill. Once scheduled if they will be out of their medication prior to the visit let me know.

## 2024-03-22 ENCOUNTER — OFFICE VISIT (OUTPATIENT)
Dept: FAMILY MEDICINE CLINIC | Age: 64
End: 2024-03-22
Payer: COMMERCIAL

## 2024-03-22 VITALS
TEMPERATURE: 97.7 F | BODY MASS INDEX: 27.6 KG/M2 | HEART RATE: 86 BPM | HEIGHT: 62 IN | DIASTOLIC BLOOD PRESSURE: 76 MMHG | OXYGEN SATURATION: 96 % | WEIGHT: 150 LBS | SYSTOLIC BLOOD PRESSURE: 130 MMHG

## 2024-03-22 DIAGNOSIS — J06.9 UPPER RESPIRATORY TRACT INFECTION, UNSPECIFIED TYPE: ICD-10-CM

## 2024-03-22 DIAGNOSIS — E55.9 VITAMIN D DEFICIENCY: ICD-10-CM

## 2024-03-22 DIAGNOSIS — R73.9 ELEVATED BLOOD SUGAR: ICD-10-CM

## 2024-03-22 DIAGNOSIS — L40.50 PSORIATIC ARTHRITIS (HCC): ICD-10-CM

## 2024-03-22 DIAGNOSIS — E78.5 HYPERLIPIDEMIA, UNSPECIFIED HYPERLIPIDEMIA TYPE: ICD-10-CM

## 2024-03-22 DIAGNOSIS — Z00.00 HEALTHCARE MAINTENANCE: Primary | ICD-10-CM

## 2024-03-22 PROCEDURE — 99396 PREV VISIT EST AGE 40-64: CPT | Performed by: FAMILY MEDICINE

## 2024-03-22 PROCEDURE — G8484 FLU IMMUNIZE NO ADMIN: HCPCS | Performed by: FAMILY MEDICINE

## 2024-03-22 SDOH — ECONOMIC STABILITY: FOOD INSECURITY: WITHIN THE PAST 12 MONTHS, YOU WORRIED THAT YOUR FOOD WOULD RUN OUT BEFORE YOU GOT MONEY TO BUY MORE.: NEVER TRUE

## 2024-03-22 SDOH — ECONOMIC STABILITY: FOOD INSECURITY: WITHIN THE PAST 12 MONTHS, THE FOOD YOU BOUGHT JUST DIDN'T LAST AND YOU DIDN'T HAVE MONEY TO GET MORE.: NEVER TRUE

## 2024-03-22 SDOH — ECONOMIC STABILITY: INCOME INSECURITY: HOW HARD IS IT FOR YOU TO PAY FOR THE VERY BASICS LIKE FOOD, HOUSING, MEDICAL CARE, AND HEATING?: SOMEWHAT HARD

## 2024-03-22 ASSESSMENT — PATIENT HEALTH QUESTIONNAIRE - PHQ9
SUM OF ALL RESPONSES TO PHQ9 QUESTIONS 1 & 2: 0
SUM OF ALL RESPONSES TO PHQ QUESTIONS 1-9: 0
SUM OF ALL RESPONSES TO PHQ QUESTIONS 1-9: 0
1. LITTLE INTEREST OR PLEASURE IN DOING THINGS: NOT AT ALL
SUM OF ALL RESPONSES TO PHQ QUESTIONS 1-9: 0
2. FEELING DOWN, DEPRESSED OR HOPELESS: NOT AT ALL
SUM OF ALL RESPONSES TO PHQ QUESTIONS 1-9: 0

## 2024-03-22 NOTE — PROGRESS NOTES
History and Physical      Chief Complaint:   Gloria Ibrahim is a 63 y.o. female who presents for complete physical examination.    Chief Complaint   Patient presents with    Annual Exam    Cough     Grand kids are sick   Took 2 covid test= neg         Assessment/Plan     Gloria was seen today for annual exam and cough.    Diagnoses and all orders for this visit:    Healthcare maintenance  -     Lipid Panel; Future  -     Basic Metabolic Panel; Future  -     Hemoglobin A1C; Future  -     Vitamin D 25 Hydroxy; Future    Elevated blood sugar  -     Hemoglobin A1C; Future    Hyperlipidemia, unspecified hyperlipidemia type  -     Lipid Panel; Future    Psoriatic arthritis (HCC)    Vitamin D deficiency  -     Vitamin D 25 Hydroxy; Future    Upper respiratory tract infection, unspecified type        Annual physical exam done today. Counseled on preventative care and a healthy lifestlye.       URI: improving. Supportive care.     Psoriatic arthritis: stable. Seeing rheum.     HLD: stable. On statin. Recheck.       Discussed medications with patient, who voiced understanding of their use and indications. All questions answered.    Return in about 1 year (around 3/22/2025) for Physical.         HPI:        URI: improved today. + congestion. No fevers. Patient is not concerned.     Psoriatic arthritis: Sees Rheumatologist:  Dr. Eddy.  On MTX and Skyrizi.       HLD: Taking lipitor and off fenofibrate. She has changed her diet and trying to make good choices.      Vit D def:  Taking MV daily and vit D.      LMP about age 47.      SH: 2023:  Retired in 2023.  Has 2 grand babies - 2 boys.  Lives with son, his girl friend and their two kids and her daughter.  New baby is coming next month - girl.  Has 2 kids.  Daughter lives in [place].   of prostate cancer 7 years ago. From [place] originally - moved here for her husbands job 23 years ago.          Vitals:    24 1030   BP: 130/76   Pulse: 86   Temp: 97.7

## 2024-04-09 RX ORDER — ATORVASTATIN CALCIUM 40 MG/1
TABLET, FILM COATED ORAL
Qty: 30 TABLET | Refills: 0 | Status: SHIPPED | OUTPATIENT
Start: 2024-04-09

## 2024-04-09 NOTE — TELEPHONE ENCOUNTER
Medication:   Requested Prescriptions     Pending Prescriptions Disp Refills    atorvastatin (LIPITOR) 40 MG tablet [Pharmacy Med Name: Atorvastatin Calcium 40 MG Oral Tablet] 30 tablet 0     Sig: Take 1 tablet by mouth once daily       Last Filled:      Patient Phone Number: [phone] (home)     Last appt: 3/22/2024   Next appt: Visit date not found    Last Lipid: 10/1/20  Lab Results   Component Value Date/Time    CHOL 338 10/01/2020 12:33 PM    TRIG 282 10/01/2020 12:33 PM    HDL 36 10/01/2020 12:33 PM    HDL 51 04/09/2012 09:26 AM    LDLCALC 246 10/01/2020 12:33 PM

## 2024-05-02 ENCOUNTER — TELEPHONE (OUTPATIENT)
Dept: FAMILY MEDICINE CLINIC | Age: 64
End: 2024-05-02

## 2024-05-02 DIAGNOSIS — Z12.31 ENCOUNTER FOR SCREENING MAMMOGRAM FOR MALIGNANT NEOPLASM OF BREAST: Primary | ICD-10-CM

## 2024-05-02 NOTE — TELEPHONE ENCOUNTER
Left voicemail to callback, please remind patient to schedule her mammogram   Order pending please look over and sign

## 2024-05-03 DIAGNOSIS — Z12.31 ENCOUNTER FOR SCREENING MAMMOGRAM FOR MALIGNANT NEOPLASM OF BREAST: Primary | ICD-10-CM

## 2024-05-03 NOTE — TELEPHONE ENCOUNTER
LMOM & sent [employer] message advising patient to contact Central Scheduling to schedule Mammogram

## 2024-05-06 RX ORDER — ATORVASTATIN CALCIUM 40 MG/1
TABLET, FILM COATED ORAL
Qty: 90 TABLET | Refills: 3 | Status: SHIPPED | OUTPATIENT
Start: 2024-05-06

## 2024-05-06 NOTE — TELEPHONE ENCOUNTER
Medication:   Requested Prescriptions     Pending Prescriptions Disp Refills    atorvastatin (LIPITOR) 40 MG tablet [Pharmacy Med Name: Atorvastatin Calcium 40 MG Oral Tablet] 30 tablet 0     Sig: Take 1 tablet by mouth once daily       Last Filled:  04/09/2024 #30 0rf    Patient Phone Number: [phone] (home)     Last appt: 3/22/2024   Next appt: Visit date not found    Last Lipid:   Lab Results   Component Value Date/Time    CHOL 338 10/01/2020 12:33 PM    TRIG 282 10/01/2020 12:33 PM    HDL 36 10/01/2020 12:33 PM    HDL 51 04/09/2012 09:26 AM

## 2024-05-10 ENCOUNTER — HOSPITAL ENCOUNTER (OUTPATIENT)
Dept: WOMENS IMAGING | Age: 64
Discharge: HOME | End: 2024-05-10
Payer: COMMERCIAL

## 2024-05-10 VITALS — HEIGHT: 62 IN | BODY MASS INDEX: 27.6 KG/M2 | WEIGHT: 150 LBS

## 2024-05-10 DIAGNOSIS — Z12.31 ENCOUNTER FOR SCREENING MAMMOGRAM FOR MALIGNANT NEOPLASM OF BREAST: ICD-10-CM

## 2024-05-10 PROCEDURE — 77067 SCR MAMMO BI INCL CAD: CPT

## 2025-06-02 RX ORDER — ATORVASTATIN CALCIUM 40 MG/1
40 TABLET, FILM COATED ORAL DAILY
Qty: 30 TABLET | Refills: 0 | OUTPATIENT
Start: 2025-06-02

## 2025-06-02 NOTE — TELEPHONE ENCOUNTER
Medication:   Requested Prescriptions     Pending Prescriptions Disp Refills    atorvastatin (LIPITOR) 40 MG tablet [Pharmacy Med Name: Atorvastatin Calcium 40 MG Oral Tablet] 30 tablet 0     Sig: Take 1 tablet by mouth once daily       Last Filled:  05/06/2024 #90 3rf    Patient Phone Number: [phone] (home)     Last appt: 3/22/2024   Next appt: Visit date not found    Last Lipid:   Lab Results   Component Value Date/Time    CHOL 338 10/01/2020 12:33 PM    TRIG 282 10/01/2020 12:33 PM    HDL 36 10/01/2020 12:33 PM    HDL 51 04/09/2012 09:26 AM

## 2025-06-09 RX ORDER — ATORVASTATIN CALCIUM 40 MG/1
40 TABLET, FILM COATED ORAL DAILY
Qty: 30 TABLET | Refills: 0 | OUTPATIENT
Start: 2025-06-09

## 2025-06-09 NOTE — TELEPHONE ENCOUNTER
Medication:   Requested Prescriptions     Pending Prescriptions Disp Refills    atorvastatin (LIPITOR) 40 MG tablet [Pharmacy Med Name: Atorvastatin Calcium 40 MG Oral Tablet] 30 tablet 0     Sig: Take 1 tablet by mouth once daily       Last Filled:  05/06/2024 #90 3rf     Patient Phone Number: [phone] (home)     Last appt: 3/22/2024   Next appt: Visit date not found    Last Lipid:   Lab Results   Component Value Date/Time    CHOL 338 10/01/2020 12:33 PM    TRIG 282 10/01/2020 12:33 PM    HDL 36 10/01/2020 12:33 PM    HDL 51 04/09/2012 09:26 AM

## 2025-06-16 RX ORDER — ATORVASTATIN CALCIUM 40 MG/1
40 TABLET, FILM COATED ORAL DAILY
Qty: 30 TABLET | Refills: 0 | OUTPATIENT
Start: 2025-06-16

## 2025-06-16 NOTE — TELEPHONE ENCOUNTER
Medication:   Requested Prescriptions     Pending Prescriptions Disp Refills    atorvastatin (LIPITOR) 40 MG tablet [Pharmacy Med Name: Atorvastatin Calcium 40 MG Oral Tablet] 30 tablet 0     Sig: Take 1 tablet by mouth once daily       Last Filled:  05/06/2024 #90 3rf     Patient Phone Number: [phone] (home)     Last appt: 3/22/2024   Next appt: Visit date not found    Last Lipid:   Lab Results   Component Value Date/Time    CHOL 338 10/01/2020 12:33 PM    TRIG 282 10/01/2020 12:33 PM    HDL 36 10/01/2020 12:33 PM    HDL 51 04/09/2012 09:26 AM

## 2025-06-18 RX ORDER — ATORVASTATIN CALCIUM 40 MG/1
TABLET, FILM COATED ORAL
Qty: 90 TABLET | Refills: 3 | Status: SHIPPED | OUTPATIENT
Start: 2025-06-18

## 2025-06-18 NOTE — TELEPHONE ENCOUNTER
----- Message from Anisa LEE sent at 6/18/2025 11:15 AM EDT -----  Regarding: ECC Message to Provider  ECC Message to Provider    Relationship to Patient: Self     Additional Information the patient need medication as soon as possible   --------------------------------------------------------------------------------------------------------------------------    Call Back Information: OK to leave message on voicemail  Preferred Call Back Number: Phone [phone] (home)

## 2025-06-18 NOTE — TELEPHONE ENCOUNTER
Medication:   Requested Prescriptions     Pending Prescriptions Disp Refills    atorvastatin (LIPITOR) 40 MG tablet 90 tablet 3     Sig: Take 1 tablet by mouth once daily       Last Filled:  5/06/2024 90 tablet, Refills: 3 ordered     Patient Phone Number: [phone] (home)     Last appt: 3/22/2024   Next appt: 7/15/2025    Last Lipid:   Lab Results   Component Value Date/Time    CHOL 338 10/01/2020 12:33 PM    TRIG 282 10/01/2020 12:33 PM    HDL 36 10/01/2020 12:33 PM    HDL 51 04/09/2012 09:26 AM